# Patient Record
Sex: FEMALE | URBAN - METROPOLITAN AREA
[De-identification: names, ages, dates, MRNs, and addresses within clinical notes are randomized per-mention and may not be internally consistent; named-entity substitution may affect disease eponyms.]

---

## 2023-10-26 DIAGNOSIS — E10.9 TYPE 1 DIABETES MELLITUS WITHOUT COMPLICATIONS (MULTI): ICD-10-CM

## 2023-10-27 ENCOUNTER — TELEPHONE (OUTPATIENT)
Dept: ENDOCRINOLOGY | Facility: HOSPITAL | Age: 48
End: 2023-10-27

## 2023-10-27 DIAGNOSIS — E10.9 TYPE 1 DIABETES MELLITUS WITHOUT COMPLICATION (MULTI): Primary | ICD-10-CM

## 2023-10-27 RX ORDER — SEMAGLUTIDE 1.34 MG/ML
INJECTION, SOLUTION SUBCUTANEOUS
Qty: 3 ML | Refills: 11 | OUTPATIENT
Start: 2023-10-27

## 2023-10-27 RX ORDER — SEMAGLUTIDE 1.34 MG/ML
1 INJECTION, SOLUTION SUBCUTANEOUS
COMMUNITY
End: 2023-10-27 | Stop reason: SDUPTHER

## 2023-10-27 RX ORDER — SEMAGLUTIDE 1.34 MG/ML
1 INJECTION, SOLUTION SUBCUTANEOUS
Qty: 9 ML | Refills: 3 | Status: SHIPPED | OUTPATIENT
Start: 2023-10-27

## 2023-10-27 RX ORDER — ROSUVASTATIN CALCIUM 10 MG/1
10 TABLET, COATED ORAL NIGHTLY
COMMUNITY
Start: 2022-11-06 | End: 2024-03-14 | Stop reason: SDUPTHER

## 2023-10-27 NOTE — TELEPHONE ENCOUNTER
----- Message from Imani Hancock PharmD sent at 10/27/2023  1:12 PM EDT -----  Regarding: Rx Request- Denied  Hello Dr. Daniel,  Ms. Bryant has requested a refill on their Ozempic. They have not been seen since 10/2022, and they are due for an appointment prior to more refills being sent. Please review.     Please let me know if you have any questions.    Imani Hancock PharmD

## 2023-11-06 ENCOUNTER — TELEPHONE (OUTPATIENT)
Dept: ENDOCRINOLOGY | Facility: CLINIC | Age: 48
End: 2023-11-06

## 2023-11-06 NOTE — TELEPHONE ENCOUNTER
Received fax communication from [email or web address] ., Medical mutual denied prior auth for patients ozempic. Insurance only provides coverage for patients with type 2 diabetes.Hailee Marx LPN

## 2023-11-10 ENCOUNTER — TELEPHONE (OUTPATIENT)
Dept: ENDOCRINOLOGY | Facility: CLINIC | Age: 48
End: 2023-11-10

## 2023-11-10 NOTE — TELEPHONE ENCOUNTER
CMN for Cgm received from [employer] , form filled out and faxed to [phone]. Hailee Marx LPN

## 2023-11-14 DIAGNOSIS — E10.9 TYPE 1 DIABETES MELLITUS WITHOUT COMPLICATIONS (MULTI): ICD-10-CM

## 2023-11-14 RX ORDER — INSULIN LISPRO 100 [IU]/ML
INJECTION, SOLUTION INTRAVENOUS; SUBCUTANEOUS
Qty: 30 ML | Refills: 0 | Status: SHIPPED | OUTPATIENT
Start: 2023-11-14 | End: 2024-03-14 | Stop reason: SDUPTHER

## 2023-11-30 ENCOUNTER — TELEPHONE (OUTPATIENT)
Dept: ENDOCRINOLOGY | Facility: CLINIC | Age: 48
End: 2023-11-30

## 2023-11-30 NOTE — TELEPHONE ENCOUNTER
Called to schedule follow up for med refill with Dr Daniel. Left voicemail with contact information.

## 2023-12-06 ENCOUNTER — TELEPHONE (OUTPATIENT)
Dept: ENDOCRINOLOGY | Facility: CLINIC | Age: 48
End: 2023-12-06

## 2023-12-06 NOTE — TELEPHONE ENCOUNTER
CMN for diabetic supplies received from [facility] ([place], [place]) . Form completed and fax as requested. Hailee Marx LPN

## 2024-03-13 ENCOUNTER — TELEMEDICINE (OUTPATIENT)
Dept: ENDOCRINOLOGY | Facility: CLINIC | Age: 49
End: 2024-03-13
Payer: COMMERCIAL

## 2024-03-13 VITALS — WEIGHT: 142 LBS

## 2024-03-13 DIAGNOSIS — E10.9 TYPE 1 DIABETES MELLITUS WITHOUT COMPLICATIONS (MULTI): ICD-10-CM

## 2024-03-13 DIAGNOSIS — E55.9 VITAMIN D DEFICIENCY: ICD-10-CM

## 2024-03-13 DIAGNOSIS — E10.9 TYPE 1 DIABETES MELLITUS WITHOUT COMPLICATION (MULTI): Primary | ICD-10-CM

## 2024-03-13 PROCEDURE — 99214 OFFICE O/P EST MOD 30 MIN: CPT | Performed by: INTERNAL MEDICINE

## 2024-03-13 PROCEDURE — 1036F TOBACCO NON-USER: CPT | Performed by: INTERNAL MEDICINE

## 2024-03-13 RX ORDER — INSULIN DETEMIR 100 [IU]/ML
INJECTION, SOLUTION SUBCUTANEOUS
COMMUNITY

## 2024-03-13 RX ORDER — EZETIMIBE 10 MG/1
1 TABLET ORAL NIGHTLY
COMMUNITY
Start: 2022-11-03 | End: 2024-03-14 | Stop reason: SDUPTHER

## 2024-03-13 RX ORDER — ESTRADIOL 0.05 MG/D
1 PATCH, EXTENDED RELEASE TRANSDERMAL
COMMUNITY
Start: 2024-03-01

## 2024-03-13 RX ORDER — ESTRADIOL 0.1 MG/G
4 CREAM VAGINAL DAILY
COMMUNITY
Start: 2024-03-01

## 2024-03-13 RX ORDER — PROGESTERONE 100 MG/1
100 CAPSULE ORAL DAILY
COMMUNITY
Start: 2024-03-01

## 2024-03-14 ENCOUNTER — APPOINTMENT (OUTPATIENT)
Dept: ENDOCRINOLOGY | Facility: CLINIC | Age: 49
End: 2024-03-14
Payer: COMMERCIAL

## 2024-03-14 RX ORDER — INSULIN LISPRO 100 [IU]/ML
INJECTION, SOLUTION INTRAVENOUS; SUBCUTANEOUS
Qty: 30 ML | Refills: 11 | Status: SHIPPED | OUTPATIENT
Start: 2024-03-14

## 2024-03-14 RX ORDER — ROSUVASTATIN CALCIUM 10 MG/1
10 TABLET, COATED ORAL NIGHTLY
Qty: 90 TABLET | Refills: 3 | Status: SHIPPED | OUTPATIENT
Start: 2024-03-14

## 2024-03-14 RX ORDER — EZETIMIBE 10 MG/1
10 TABLET ORAL NIGHTLY
Qty: 90 TABLET | Refills: 3 | Status: SHIPPED | OUTPATIENT
Start: 2024-03-14

## 2024-03-14 NOTE — PROGRESS NOTES
Consults    Reason For Consult  Type 1 diabetes     History Of Present Illness  Erika Bryant is a 48 y.o. female presenting with type 1 diabetes routine follow up .     She has been type I diabetes since age around 36 .  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  She has bee stable with weight , although fluctuated due to recent menopause   As she had hot flushes and this was interfering with her surgies , started HT     She also has elevated LP(a), uses crestor and zetia currently         Blood pressure control when she checks it in her office at goal 130/70 or lower      Periods regular with normal annual pap   and normal annual mammogram             Any family history of thyroid cancer? no  Any personal history of radiation to your head/neck? no    Past Medical History  She has a past medical history of Hypoglycemia, Type 1 diabetes mellitus without complications (CMS/HCC) (11/21/2022), and Vitamin D deficiency.    Surgical History  She has a past surgical history that includes Other surgical history (06/27/2021).     Social History  She reports that she has never smoked. She has never used smokeless tobacco. She reports current alcohol use. She reports that she does not use drugs.    Family History  Family History   Problem Relation Name Age of Onset    Diabetes Mother Mother     Diabetes Sister Sister     Diabetes Mother's Brother Uncle         Allergies  Dulaglutide and Promethazine    Review of Systems    Past Medical History:   Diagnosis Date    Hypoglycemia     Type 1 diabetes mellitus without complications (CMS/HCC) 11/21/2022    Diabetes type 1, controlled    Vitamin D deficiency        Past Surgical History:   Procedure Laterality Date    OTHER SURGICAL HISTORY   "06/27/2021    Vascular surgical procedure       Social History     Socioeconomic History    Marital status: Unknown     Spouse name: Not on file    Number of children: Not on file    Years of education: Not on file    Highest education level: Not on file   Occupational History    Not on file   Tobacco Use    Smoking status: Never    Smokeless tobacco: Never   Substance and Sexual Activity    Alcohol use: Yes     Comment: Infrequent    Drug use: Never    Sexual activity: Yes     Partners: Male     Birth control/protection: Male Sterilization   Other Topics Concern    Not on file   Social History Narrative    Not on file     Social Determinants of Health     Financial Resource Strain: Not on file   Food Insecurity: Not on file   Transportation Needs: Not on file   Physical Activity: Not on file   Stress: Not on file   Social Connections: Not on file   Intimate Partner Violence: Not on file   Housing Stability: Not on file        Physical Exam     ROS, PMH, FH/SH, surgical history and allergies have been reviewed.    Last Recorded Vitals  Weight 64.4 kg (142 lb).    Relevant Results         If you would like to pull in Medications, type .meds     If you would like to pull in Lab results for the last 24 hours, type .[id number]    If you would like to pull in specific Lab results, type .ll     If you would like to pull in Imaging results, type .imgrslt :99}  Lab Review  No results found for: \"BILITOT\", \"CALCIUM\", \"CO2\", \"CL\", \"CREATININE\", \"GLUCOSE\", \"ALKPHOS\", \"K\", \"PROT\", \"NA\", \"AST\", \"ALT\", \"BUN\", \"ANIONGAP\", \"MG\", \"PHOS\", \"GGT\", \"LDH\", \"ALBUMIN\", \"AMYLASE\", \"LIPASE\", \"GFRF\", \"GFRMALE\"  No results found for: \"TRIG\", \"CHOL\", \"LDLCALC\", \"HDL\"  Lab Results   Component Value Date    HGBA1C 5.6 03/02/2024    HGBA1C 5.9 (H) 10/28/2022    HGBA1C 5.4 06/25/2021     The ASCVD Risk score (Cammy PINEDA, et al., 2019) failed to calculate for the following reasons:    The systolic blood pressure is missing    Cannot find a previous " HDL lab    Cannot find a previous total cholesterol lab    Unable to determine if patient is Non-        Assessment/Plan   Problem List Items Addressed This Visit    None  Visit Diagnoses         Codes    Type 1 diabetes mellitus without complication (CMS/HCC)    -  Primary E10.9    Relevant Medications    ezetimibe (Zetia) 10 mg tablet    rosuvastatin (Crestor) 10 mg tablet    Other Relevant Orders    Vitamin B12    Vitamin B6    Vitamin B2, Plasma    Vitamin D 25-Hydroxy,Total (for eval of Vitamin D levels)    Estradiol Free and Total    FSH & LH    DHEA-Sulfate    CT cardiac scoring wo IV contrast    Vitamin D deficiency     E55.9    Relevant Medications    ezetimibe (Zetia) 10 mg tablet    rosuvastatin (Crestor) 10 mg tablet    Other Relevant Orders    Vitamin B12    Vitamin B6    Vitamin B2, Plasma    Vitamin D 25-Hydroxy,Total (for eval of Vitamin D levels)    Estradiol Free and Total    FSH & LH    DHEA-Sulfate    CT cardiac scoring wo IV contrast    Type 1 diabetes mellitus without complications (CMS/HCC)     E10.9    Relevant Medications    ezetimibe (Zetia) 10 mg tablet    insulin lispro (HumaLOG U-100 Insulin) 100 unit/mL injection    rosuvastatin (Crestor) 10 mg tablet                  Diabetes type 1, controlled (250.01) (E10.9)   · Low vitamin D level (790.6) (R79.89)  Elevated Lpa  Post menopausal symptoms       Agree with her HRT, due to symptoms   She is resuming the statin - zetia combo  Hba1c well controlled  She is avoiding hypoglycemia as well  Alb/creat normal  Blood pressure at goal  Seen ophthalmology and podiatry within a month     Refills provided    On tandem dexcom  Wants to try few pods the days she is needing a tubeless sytem    I spent a total of 30+ minutes on the date of the service which included preparing to see the patient, face-to-face patient care, completing clinical documentation, obtaining and / or reviewing separately obtained history, counseling and  educating the patient/family/caregiver, ordering medications, tests, or procedures, communicating with other healthcare providers (not separately reported), independently interpreting results, not separately reported, and communicating results to the patient/family/caregiver, real-time telemedicine visit using audiovisual technology with patient's verbal consent.  Name and date of birth verified.      Anette Daniel MD

## 2024-09-11 ENCOUNTER — TELEPHONE (OUTPATIENT)
Dept: ENDOCRINOLOGY | Facility: CLINIC | Age: 49
End: 2024-09-11
Payer: COMMERCIAL

## 2024-09-11 NOTE — TELEPHONE ENCOUNTER
Received DWO from [employer] for infusion supplies  Form signed by provider and faxed to [employer] [phone] with confirmation  Form sent to scanning

## 2024-10-09 ENCOUNTER — TELEPHONE (OUTPATIENT)
Dept: ENDOCRINOLOGY | Facility: CLINIC | Age: 49
End: 2024-10-09
Payer: COMMERCIAL

## 2024-10-09 NOTE — TELEPHONE ENCOUNTER
[employer] [facility]- sensor, transmitter   Completed, signed by provider and faxed to [employer] [phone].  Received fax confirmation  Sent to scanning

## 2024-10-14 DIAGNOSIS — E10.9 TYPE 1 DIABETES MELLITUS WITHOUT COMPLICATION: ICD-10-CM

## 2024-10-15 RX ORDER — SEMAGLUTIDE 1.34 MG/ML
INJECTION, SOLUTION SUBCUTANEOUS
Qty: 3 ML | Refills: 8 | Status: SHIPPED | OUTPATIENT
Start: 2024-10-15

## 2024-12-08 ENCOUNTER — PATIENT MESSAGE (OUTPATIENT)
Dept: ENDOCRINOLOGY | Facility: CLINIC | Age: 49
End: 2024-12-08
Payer: COMMERCIAL

## 2024-12-08 DIAGNOSIS — E10.9 TYPE 1 DIABETES MELLITUS WITHOUT COMPLICATION: ICD-10-CM

## 2024-12-08 DIAGNOSIS — E55.9 VITAMIN D DEFICIENCY: ICD-10-CM

## 2024-12-08 DIAGNOSIS — E10.9 TYPE 1 DIABETES MELLITUS WITHOUT COMPLICATIONS: Primary | ICD-10-CM

## 2024-12-09 RX ORDER — METFORMIN HYDROCHLORIDE 500 MG/1
TABLET, EXTENDED RELEASE ORAL
Qty: 180 TABLET | Refills: 3 | Status: SHIPPED | OUTPATIENT
Start: 2024-12-09 | End: 2024-12-10 | Stop reason: SDUPTHER

## 2024-12-09 RX ORDER — EZETIMIBE 10 MG/1
10 TABLET ORAL NIGHTLY
Qty: 90 TABLET | Refills: 3 | Status: SHIPPED | OUTPATIENT
Start: 2024-12-09

## 2024-12-09 RX ORDER — GLUCAGON INJECTION, SOLUTION 1 MG/.2ML
INJECTION, SOLUTION SUBCUTANEOUS
Qty: 0.2 ML | Refills: 11 | Status: SHIPPED | OUTPATIENT
Start: 2024-12-09

## 2024-12-09 RX ORDER — SEMAGLUTIDE 1.34 MG/ML
1 INJECTION, SOLUTION SUBCUTANEOUS WEEKLY
Qty: 3 ML | Refills: 3 | Status: SHIPPED | OUTPATIENT
Start: 2024-12-09

## 2024-12-09 RX ORDER — ROSUVASTATIN CALCIUM 10 MG/1
10 TABLET, COATED ORAL NIGHTLY
Qty: 90 TABLET | Refills: 3 | Status: SHIPPED | OUTPATIENT
Start: 2024-12-09

## 2024-12-10 DIAGNOSIS — E10.9 TYPE 1 DIABETES MELLITUS WITHOUT COMPLICATIONS: ICD-10-CM

## 2024-12-10 RX ORDER — METFORMIN HYDROCHLORIDE 500 MG/1
TABLET, EXTENDED RELEASE ORAL
Qty: 180 TABLET | Refills: 3 | Status: SHIPPED | OUTPATIENT
Start: 2024-12-10

## 2024-12-12 ENCOUNTER — TELEMEDICINE (OUTPATIENT)
Dept: ENDOCRINOLOGY | Facility: CLINIC | Age: 49
End: 2024-12-12
Payer: COMMERCIAL

## 2024-12-12 VITALS — BODY MASS INDEX: 26.46 KG/M2 | HEIGHT: 64 IN | WEIGHT: 155 LBS

## 2024-12-12 DIAGNOSIS — L23.1 ALLERGIC CONTACT DERMATITIS DUE TO ADHESIVES: ICD-10-CM

## 2024-12-12 DIAGNOSIS — E10.9 TYPE 1 DIABETES MELLITUS WITHOUT COMPLICATION: Primary | ICD-10-CM

## 2024-12-12 DIAGNOSIS — Z46.81 INSULIN PUMP FITTING OR ADJUSTMENT: ICD-10-CM

## 2024-12-12 DIAGNOSIS — E78.41 ELEVATED LIPOPROTEIN(A): ICD-10-CM

## 2024-12-12 PROCEDURE — 3008F BODY MASS INDEX DOCD: CPT | Performed by: NURSE PRACTITIONER

## 2024-12-12 PROCEDURE — 95251 CONT GLUC MNTR ANALYSIS I&R: CPT | Performed by: NURSE PRACTITIONER

## 2024-12-12 PROCEDURE — 99214 OFFICE O/P EST MOD 30 MIN: CPT | Performed by: NURSE PRACTITIONER

## 2024-12-12 ASSESSMENT — ENCOUNTER SYMPTOMS
WEAKNESS: 0
NAUSEA: 0
CONSTIPATION: 0
NERVOUS/ANXIOUS: 0
DIZZINESS: 0
NUMBNESS: 0
APPETITE CHANGE: 0
POLYPHAGIA: 0
PALPITATIONS: 0
DIARRHEA: 0
ACTIVITY CHANGE: 0
FATIGUE: 0
SLEEP DISTURBANCE: 0
FREQUENCY: 0
SHORTNESS OF BREATH: 0
POLYDIPSIA: 0
SEIZURES: 0

## 2024-12-12 ASSESSMENT — PATIENT HEALTH QUESTIONNAIRE - PHQ9
1. LITTLE INTEREST OR PLEASURE IN DOING THINGS: NOT AT ALL
SUM OF ALL RESPONSES TO PHQ9 QUESTIONS 1 AND 2: 0
2. FEELING DOWN, DEPRESSED OR HOPELESS: NOT AT ALL

## 2024-12-12 NOTE — PATIENT INSTRUCTIONS
Type 1 diabetes mellitus, is at goal.   RX changes:   Continue current pump settings.   Change infusion sets every 2 days as directed due to contact dermatitis.   Hyperlipidemia: Taking statin and ezetimibe as directed.   Education:  hypoglycemia prevention and treatment  Follow up: I recommend diabetes care be 6 months.

## 2024-12-12 NOTE — PROGRESS NOTES
Subjective   Erika Bryant is a 49 y.o. female here today for a new patient visit regarding Type 1 diabetes. Initial diagnosis with diabetes was age in her 30's.  The patient has a family history mother.    She has done genetics studies in her 2 biological children: one does not have same genes and one does. She has tested his antibodies and they are negative.     She is parenting 3 children of her sister's, who also has 3 children, one with 4 antibodies positive. Is is not dysglycemia at this time.       Known complications include: none    Previously seeing Dr Daniel for diabetes care.    A1c 5.6% March 2024.  Previous A1c 5.9% 2023      Current diabetes regimen is as follows:   Metformin 500 mg  Ozempic 1 mg weekly  Tandem insulin pump with Dexcom g6    Patient is using continuous glucose monitor- Dexcom G6  The patient is currently checking the blood glucose as needed.    Hypoglycemia frequency: 0%  Hypoglycemia awareness: yes    Regarding symptoms of hyperglycemia, the patient is not experiencing symptoms such as polydipsia, polyuria, nocturia, and blurry vision.     Last foot exam: None. Denies issues at today's exam.   Last eye exam: 1 year. Denies retinopathy  Last urine albumin: <12.0 March 2024  Last LDL: 72 July 2024. Taking statin and ezetimibe.     Review of Systems   Constitutional:  Negative for activity change, appetite change and fatigue.   Respiratory:  Negative for shortness of breath.    Cardiovascular:  Negative for chest pain, palpitations and leg swelling.   Gastrointestinal:  Negative for constipation, diarrhea and nausea.   Endocrine: Negative for cold intolerance, heat intolerance, polydipsia, polyphagia and polyuria.   Genitourinary:  Negative for frequency.   Musculoskeletal:  Negative for gait problem.   Skin:  Negative for rash.   Neurological:  Negative for dizziness, seizures, weakness and numbness.   Psychiatric/Behavioral:  Negative for sleep disturbance and suicidal ideas. The  "patient is not nervous/anxious.       Objective    Height 1.626 m (5' 4\"), weight 70.3 kg (155 lb).  Physical Exam  Pulmonary:      Effort: Pulmonary effort is normal.   Skin:     Comments: Patient has skin irritation the size and shape of her infusion sets on her abdomen    Neurological:      General: No focal deficit present.      Mental Status: She is alert and oriented to person, place, and time.   Psychiatric:         Mood and Affect: Mood normal.         Behavior: Behavior normal.         Thought Content: Thought content normal.         Judgment: Judgment normal.         No results found for: \"BILITOT\", \"CALCIUM\", \"CO2\", \"CL\", \"CREATININE\", \"GLUCOSE\", \"ALKPHOS\", \"K\", \"PROT\", \"NA\", \"AST\", \"ALT\", \"BUN\", \"ANIONGAP\", \"MG\", \"PHOS\", \"GGT\", \"LDH\", \"ALBUMIN\", \"AMYLASE\", \"LIPASE\", \"GFRF\", \"GFRMALE\"  No results found for: \"TRIG\", \"CHOL\", \"LDLCALC\", \"HDL\"  Lab Results   Component Value Date    HGBA1C 5.6 03/02/2024    HGBA1C 5.9 (H) 10/28/2022    HGBA1C 5.4 06/25/2021       The ASCVD Risk score (Cammy DK, et al., 2019) failed to calculate for the following reasons:    The systolic blood pressure is missing    Cannot find a previous HDL lab    Cannot find a previous total cholesterol lab    Unable to determine if patient is Non-           Assessment/Plan   Problem List Items Addressed This Visit    None  Visit Diagnoses       Type 1 diabetes mellitus without complication    -  Primary    Insulin pump fitting or adjustment        Elevated lipoprotein(a)        Allergic contact dermatitis due to adhesives              Type 1 diabetes mellitus, is at goal.   RX changes:   Continue current pump settings.   Change infusion sets every 2 days as directed due to contact dermatitis.   Hyperlipidemia: Taking statin and ezetimibe as directed.   Education:  hypoglycemia prevention and treatment  Follow up: I recommend diabetes care be 6 months.  "

## 2025-02-11 ENCOUNTER — TELEPHONE (OUTPATIENT)
Dept: ENDOCRINOLOGY | Facility: CLINIC | Age: 50
End: 2025-02-11
Payer: COMMERCIAL

## 2025-02-11 NOTE — TELEPHONE ENCOUNTER
Forwarded notes to contact at [email or web address] requesting any needed paperwork be forwarded to the office for completion.

## 2025-02-12 NOTE — TELEPHONE ENCOUNTER
[facility]  Completed and emailed back to contact on 2/12

## 2025-04-09 ENCOUNTER — TELEPHONE (OUTPATIENT)
Dept: ENDOCRINOLOGY | Facility: CLINIC | Age: 50
End: 2025-04-09
Payer: COMMERCIAL

## 2025-04-09 DIAGNOSIS — E10.9 TYPE 1 DIABETES MELLITUS WITHOUT COMPLICATION: ICD-10-CM

## 2025-04-09 DIAGNOSIS — E55.9 VITAMIN D DEFICIENCY: ICD-10-CM

## 2025-04-09 DIAGNOSIS — E10.9 TYPE 1 DIABETES MELLITUS WITHOUT COMPLICATIONS: ICD-10-CM

## 2025-04-09 RX ORDER — METFORMIN HYDROCHLORIDE 500 MG/1
TABLET, EXTENDED RELEASE ORAL
Qty: 180 TABLET | Refills: 3 | Status: SHIPPED | OUTPATIENT
Start: 2025-04-09

## 2025-04-09 RX ORDER — INSULIN LISPRO 100 [IU]/ML
INJECTION, SOLUTION INTRAVENOUS; SUBCUTANEOUS
Qty: 90 ML | Refills: 3 | Status: SHIPPED | OUTPATIENT
Start: 2025-04-09

## 2025-04-09 RX ORDER — GLUCAGON INJECTION, SOLUTION 1 MG/.2ML
INJECTION, SOLUTION SUBCUTANEOUS
Qty: 0.2 ML | Refills: 11 | Status: SHIPPED | OUTPATIENT
Start: 2025-04-09

## 2025-04-09 RX ORDER — ROSUVASTATIN CALCIUM 10 MG/1
10 TABLET, COATED ORAL NIGHTLY
Qty: 90 TABLET | Refills: 3 | Status: SHIPPED | OUTPATIENT
Start: 2025-04-09

## 2025-04-09 RX ORDER — EZETIMIBE 10 MG/1
10 TABLET ORAL NIGHTLY
Qty: 90 TABLET | Refills: 3 | Status: SHIPPED | OUTPATIENT
Start: 2025-04-09

## 2025-04-09 RX ORDER — SEMAGLUTIDE 1.34 MG/ML
1 INJECTION, SOLUTION SUBCUTANEOUS WEEKLY
Qty: 9 ML | Refills: 3 | Status: SHIPPED | OUTPATIENT
Start: 2025-04-09 | End: 2025-04-10 | Stop reason: SDUPTHER

## 2025-04-09 RX ORDER — SEMAGLUTIDE 1.34 MG/ML
1 INJECTION, SOLUTION SUBCUTANEOUS WEEKLY
Qty: 3 ML | Refills: 3 | Status: SHIPPED | OUTPATIENT
Start: 2025-04-09 | End: 2025-04-09 | Stop reason: SDUPTHER

## 2025-04-09 NOTE — TELEPHONE ENCOUNTER
Hi patient scheduled for 10/23 which is Thursday at 130. You mentioned a Wednesday appointment but Reny didn't see any slots please clarify thank you so much!

## 2025-04-10 DIAGNOSIS — E10.9 TYPE 1 DIABETES MELLITUS WITHOUT COMPLICATION: ICD-10-CM

## 2025-04-10 RX ORDER — SEMAGLUTIDE 1.34 MG/ML
INJECTION, SOLUTION SUBCUTANEOUS
Refills: 0 | OUTPATIENT
Start: 2025-04-10

## 2025-04-10 RX ORDER — SEMAGLUTIDE 1.34 MG/ML
1 INJECTION, SOLUTION SUBCUTANEOUS WEEKLY
Qty: 9 ML | Refills: 3 | Status: SHIPPED | OUTPATIENT
Start: 2025-04-10

## 2025-06-20 ENCOUNTER — TELEPHONE (OUTPATIENT)
Dept: ENDOCRINOLOGY | Facility: CLINIC | Age: 50
End: 2025-06-20
Payer: COMMERCIAL

## 2025-06-20 NOTE — TELEPHONE ENCOUNTER
Received electronic DWO/PO/CMN form from [employer] via [employer] to complete for CGM supplies. Form completed on 6/20 and submitted electronically.

## 2025-08-21 DIAGNOSIS — E11.65 TYPE 2 DIABETES MELLITUS WITH HYPERGLYCEMIA, WITH LONG-TERM CURRENT USE OF INSULIN: Primary | ICD-10-CM

## 2025-08-21 DIAGNOSIS — Z79.4 TYPE 2 DIABETES MELLITUS WITH HYPERGLYCEMIA, WITH LONG-TERM CURRENT USE OF INSULIN: Primary | ICD-10-CM

## 2025-08-21 RX ORDER — TIRZEPATIDE 5 MG/.5ML
5 INJECTION, SOLUTION SUBCUTANEOUS
Qty: 6 ML | Refills: 3 | Status: SHIPPED | OUTPATIENT
Start: 2025-08-21

## 2025-09-02 ENCOUNTER — TELEPHONE (OUTPATIENT)
Dept: ENDOCRINOLOGY | Facility: CLINIC | Age: 50
End: 2025-09-02
Payer: COMMERCIAL

## 2025-09-22 ENCOUNTER — APPOINTMENT (OUTPATIENT)
Dept: ENDOCRINOLOGY | Facility: CLINIC | Age: 50
End: 2025-09-22
Payer: COMMERCIAL

## 2025-10-22 ENCOUNTER — APPOINTMENT (OUTPATIENT)
Dept: ENDOCRINOLOGY | Facility: CLINIC | Age: 50
End: 2025-10-22
Payer: COMMERCIAL

## 2025-10-23 ENCOUNTER — APPOINTMENT (OUTPATIENT)
Dept: ENDOCRINOLOGY | Facility: CLINIC | Age: 50
End: 2025-10-23
Payer: COMMERCIAL